# Patient Record
(demographics unavailable — no encounter records)

---

## 2024-11-13 NOTE — ASSESSMENT
[FreeTextEntry1] : #BRBPR - likely hemorrhoidal vs diverticular - recent hospitalization for blood in the stool on 8/19 - hospital labs showed anemia with HG of 11 - no inpt EGD/colonoscopy done; he will have further workup as OP - Saw GI in September, waiting for pulm risk stratification (11/25) prior to scheduling colonoscopy - No more blood in stool, hasn't had blood in 2 months - RCRI: 0, <4 MET, ekg performed and personally reviewed- no injury pattern patient low to intermediate risk for colonoscopy. obtain pulmonary preprocedural evaluation for the colonoscopy check labs  #hypoxia 2/2 COPD vs JAZMYN - Continues to have mild wheezing, but improved sx - dx with COPD at recent hosp visit on 8/19; former smoker of 30yrs pack hx - patient sent home with BIPAP, not on home O2 - discharged with Spiriva, Symbicort, and albuterol pharmacist demonstrated use today - Had appt with pulmonology 11/25  #MDD - major depressive disorder - No suicidal ideation - PHQ 9 score 10, recent hospitalization skewed some numbers - Zoloft, mirtazapine, Seroquel  - Following up with his psychiatrist - EKG  on dec 2023  Depression/Anxiety- Suicide Screening; Patient denies any suicidal and homicidal ideations at this time. Patient will follow up with specialist if worsening symptoms Information provided on psychiatric ER  #HTN  - BP today is 138/82 - Medication refill - c/w Losartan 100mg QD, Amlodipine 10mg QD, Coreg 25mg BID - Home BP monitoring and recording for two weeks  HTN; DASH diet discussed and recommended Exercise and weight loss counseled  Frequency and target at home BP readings discussed Decrease caffeine intake Treatment options and possible side effects discussed Patient counseled on symptoms of hypo/hypertension Counseled: Yearly Ophthalmology exams  #HCM: - Colonoscopy planned for after pulm risk stratification later this month Colon Cancer Screening; Patient counseled on the importance of colonoscopy screening and directed to follow-up with GI doctor. Failure to perform test can result in Colon Cancer and Death. - labs not done prior to visit - Flu vax: denies - Blood work and RTC 3 months

## 2024-11-13 NOTE — END OF VISIT
[] : Resident [FreeTextEntry3] : I saw the patient, examined the patient independently, reviewed medical record, and provided the medical services. Agree with resident note as personally edited above. no recurrent of lgib over past week breathing has been stable low exercise capacity <4 met risk stratification as above

## 2024-11-13 NOTE — REVIEW OF SYSTEMS
[Shortness Of Breath] : shortness of breath [Frequency] : frequency [Joint Pain] : joint pain [Fever] : no fever [Chills] : no chills [Pain] : no pain [Vision Problems] : no vision problems [Earache] : no earache [Hearing Loss] : no hearing loss [Chest Pain] : no chest pain [Palpitations] : no palpitations [Wheezing] : no wheezing [Abdominal Pain] : no abdominal pain [Nausea] : no nausea [Constipation] : no constipation [Diarrhea] : no diarrhea [Dysuria] : no dysuria [Muscle Pain] : no muscle pain [Joint Swelling] : no joint swelling [Skin Rash] : no skin rash [Headache] : no headache [Dizziness] : no dizziness [Fainting] : no fainting [Suicidal] : not suicidal [Depression] : no depression [FreeTextEntry8] : waking up 2/3x a night to urinate [FreeTextEntry6] : slight SOB when walking [FreeTextEntry9] : knee pain

## 2024-11-13 NOTE — HISTORY OF PRESENT ILLNESS
[FreeTextEntry1] : u [de-identified] : 48yr old male with a PMH of MDD on Zoloft, JAZMYN on Cpap, Morbid Obesity, Former smoker (Stopped 1 year ago, 30 pack year), COPD and HTN presenting for a 3 month follow up. Patient had a recent hospitalization in August for blood in the stool and hypoxia 2/2 to newly diagnosed COPD. Patient was admitted on 8/19 and D/C on 8/27. He was set up with a new BIPAP machine at home but not on O2 at home Pulmonology appt scheduled for 11/25. Saw GI in September, plan for colonoscopy after pulm risk stratification. Hasn't had blood in stool in past 2 months. He endorses slight SOB when walking. He denies any chest pain, edema, abdominal pain, N/V, or constipation.

## 2024-11-13 NOTE — PHYSICAL EXAM
[No Acute Distress] : no acute distress [Well Developed] : well developed [Normal Sclera/Conjunctiva] : normal sclera/conjunctiva [PERRL] : pupils equal round and reactive to light [Normal Outer Ear/Nose] : the outer ears and nose were normal in appearance [No Lymphadenopathy] : no lymphadenopathy [No Respiratory Distress] : no respiratory distress  [Clear to Auscultation] : lungs were clear to auscultation bilaterally [Normal Rate] : normal rate  [Regular Rhythm] : with a regular rhythm [No Edema] : there was no peripheral edema [No Extremity Clubbing/Cyanosis] : no extremity clubbing/cyanosis [Soft] : abdomen soft [Non Tender] : non-tender [Normal Bowel Sounds] : normal bowel sounds [No Joint Swelling] : no joint swelling [No Rash] : no rash [No Focal Deficits] : no focal deficits [Normal Affect] : the affect was normal [Normal Insight/Judgement] : insight and judgment were intact [de-identified] : obese [de-identified] : morbid obesity

## 2024-11-25 NOTE — HISTORY OF PRESENT ILLNESS
[TextBox_4] : - Summary : The patient is here today for a follow-up consultation regarding his sleep apnea and COPD management. He is scheduled to have a colonoscopy and has concerns about his health risk during the procedure due to his sleep apnea. - Chief Complaint (CC) : The patient mostly complains about discomfort with the mask of the CPAP machine. He has been using the machine every night for at least about four hours. - History of Present Illness : Corbin Iniguez, a male patient who was diagnosed with obstructive sleep apnea and COPD more than a year ago, presents today for a follow-up visit. He uses his BiPAP machine nightly for at least four hours because of his COPD. The patient has recently lost 20 pounds. He doesn't report any problem with daytime sleepiness or complaints about involuntary sleep during the day. The patient reports discomfort with the use of his CPAP mask at times. He has a scheduled visit for a colonoscopy in December. The patient reports that if he sleeps without the machine, he snores. The patient was diagnosed with COPD while in the hospital last time.

## 2024-11-25 NOTE — ASSESSMENT
[FreeTextEntry1] : Assessment: COPD  plan: Stress compliance with inhalers. Renewed today. cont PRN albuterol cont ICS/LABA needs PFT weight loss  Assessment: JAZMYN Obesity EDS controlled   PLAN: The patient is using and benefitting from the PAP device. There is good compliance with the CPAP. New supplies will be ordered when required. Weight loss maintenance discussed. I stressed the need maintain compliance with the PAP device. The patient is not to use an Ozone or UV sterilizer. The patient was educated in the absolute requirement to use the PAP device nightly   F/U in 12 months

## 2024-11-25 NOTE — PHYSICAL EXAM
[No Acute Distress] : no acute distress [Normal Oropharynx] : normal oropharynx [Normal Appearance] : normal appearance [No Neck Mass] : no neck mass [Normal Rate/Rhythm] : normal rate/rhythm [Normal S1, S2] : normal s1, s2 [No Murmurs] : no murmurs [No Resp Distress] : no resp distress [Clear to Auscultation Bilaterally] : clear to auscultation bilaterally [No Abnormalities] : no abnormalities [Normal Gait] : normal gait [No Clubbing] : no clubbing [No Cyanosis] : no cyanosis [No Edema] : no edema [FROM] : FROM [Normal Color/ Pigmentation] : normal color/ pigmentation [No Focal Deficits] : no focal deficits [Oriented x3] : oriented x3 [Normal Affect] : normal affect [TextBox_89] : morbidly obese